# Patient Record
Sex: FEMALE | URBAN - METROPOLITAN AREA
[De-identification: names, ages, dates, MRNs, and addresses within clinical notes are randomized per-mention and may not be internally consistent; named-entity substitution may affect disease eponyms.]

---

## 2022-12-11 ENCOUNTER — EMERGENCY (EMERGENCY)
Facility: HOSPITAL | Age: 72
LOS: 1 days | Discharge: HOME | End: 2022-12-11
Attending: EMERGENCY MEDICINE
Payer: MEDICARE

## 2022-12-11 VITALS
HEIGHT: 63 IN | DIASTOLIC BLOOD PRESSURE: 80 MMHG | SYSTOLIC BLOOD PRESSURE: 168 MMHG | OXYGEN SATURATION: 99 % | TEMPERATURE: 98 F | HEART RATE: 75 BPM | RESPIRATION RATE: 16 BRPM | WEIGHT: 169.98 LBS

## 2022-12-11 LAB
ALBUMIN SERPL ELPH-MCNC: 4.5 G/DL — SIGNIFICANT CHANGE UP (ref 3.3–5)
ALP SERPL-CCNC: 79 U/L — SIGNIFICANT CHANGE UP (ref 40–120)
ALT FLD-CCNC: 21 U/L — SIGNIFICANT CHANGE UP (ref 10–45)
ANION GAP SERPL CALC-SCNC: 16 MMOL/L — SIGNIFICANT CHANGE UP (ref 5–17)
APPEARANCE UR: CLEAR — SIGNIFICANT CHANGE UP
AST SERPL-CCNC: 27 U/L — SIGNIFICANT CHANGE UP (ref 10–40)
BACTERIA # UR AUTO: NEGATIVE — SIGNIFICANT CHANGE UP
BASE EXCESS BLDMV CALC-SCNC: 0.4 MMOL/L — SIGNIFICANT CHANGE UP (ref -3–3)
BASOPHILS # BLD AUTO: 0.02 K/UL — SIGNIFICANT CHANGE UP (ref 0–0.2)
BASOPHILS NFR BLD AUTO: 0.3 % — SIGNIFICANT CHANGE UP (ref 0–2)
BILIRUB SERPL-MCNC: 0.5 MG/DL — SIGNIFICANT CHANGE UP (ref 0.2–1.2)
BILIRUB UR-MCNC: NEGATIVE — SIGNIFICANT CHANGE UP
BUN SERPL-MCNC: 16 MG/DL — SIGNIFICANT CHANGE UP (ref 7–23)
CALCIUM SERPL-MCNC: 9.4 MG/DL — SIGNIFICANT CHANGE UP (ref 8.4–10.5)
CHLORIDE SERPL-SCNC: 106 MMOL/L — SIGNIFICANT CHANGE UP (ref 96–108)
CO2 BLDMV-SCNC: 28 MMOL/L — SIGNIFICANT CHANGE UP (ref 21–29)
CO2 SERPL-SCNC: 20 MMOL/L — LOW (ref 22–31)
COHGB MFR BLDMV: 0.9 % — SIGNIFICANT CHANGE UP (ref 0–3)
COLOR SPEC: COLORLESS — SIGNIFICANT CHANGE UP
CREAT SERPL-MCNC: 0.72 MG/DL — SIGNIFICANT CHANGE UP (ref 0.5–1.3)
DIFF PNL FLD: NEGATIVE — SIGNIFICANT CHANGE UP
EGFR: 90 ML/MIN/1.73M2 — SIGNIFICANT CHANGE UP
EOSINOPHIL # BLD AUTO: 0.05 K/UL — SIGNIFICANT CHANGE UP (ref 0–0.5)
EOSINOPHIL NFR BLD AUTO: 0.8 % — SIGNIFICANT CHANGE UP (ref 0–6)
EPI CELLS # UR: 0 /HPF — SIGNIFICANT CHANGE UP
FLUAV AG NPH QL: SIGNIFICANT CHANGE UP
FLUBV AG NPH QL: SIGNIFICANT CHANGE UP
GAS PNL BLDMV: SIGNIFICANT CHANGE UP
GLUCOSE SERPL-MCNC: 101 MG/DL — HIGH (ref 70–99)
GLUCOSE UR QL: NEGATIVE — SIGNIFICANT CHANGE UP
HCO3 BLDMV-SCNC: 27 MMOL/L — SIGNIFICANT CHANGE UP (ref 20–28)
HCT VFR BLD CALC: 39.1 % — SIGNIFICANT CHANGE UP (ref 34.5–45)
HGB BLD-MCNC: 12.7 G/DL — SIGNIFICANT CHANGE UP (ref 11.5–15.5)
HGB FLD-MCNC: 12.8 G/DL — SIGNIFICANT CHANGE UP (ref 11.7–16.1)
HYALINE CASTS # UR AUTO: 1 /LPF — SIGNIFICANT CHANGE UP (ref 0–2)
IMM GRANULOCYTES NFR BLD AUTO: 0.2 % — SIGNIFICANT CHANGE UP (ref 0–0.9)
KETONES UR-MCNC: NEGATIVE — SIGNIFICANT CHANGE UP
LEUKOCYTE ESTERASE UR-ACNC: NEGATIVE — SIGNIFICANT CHANGE UP
LYMPHOCYTES # BLD AUTO: 1.43 K/UL — SIGNIFICANT CHANGE UP (ref 1–3.3)
LYMPHOCYTES # BLD AUTO: 23.3 % — SIGNIFICANT CHANGE UP (ref 13–44)
MAGNESIUM SERPL-MCNC: 1.8 MG/DL — SIGNIFICANT CHANGE UP (ref 1.6–2.6)
MCHC RBC-ENTMCNC: 29.1 PG — SIGNIFICANT CHANGE UP (ref 27–34)
MCHC RBC-ENTMCNC: 32.5 GM/DL — SIGNIFICANT CHANGE UP (ref 32–36)
MCV RBC AUTO: 89.7 FL — SIGNIFICANT CHANGE UP (ref 80–100)
METHGB MFR BLDMV: 0 % — SIGNIFICANT CHANGE UP (ref 0–1.5)
MONOCYTES # BLD AUTO: 0.47 K/UL — SIGNIFICANT CHANGE UP (ref 0–0.9)
MONOCYTES NFR BLD AUTO: 7.6 % — SIGNIFICANT CHANGE UP (ref 2–14)
NEUTROPHILS # BLD AUTO: 4.17 K/UL — SIGNIFICANT CHANGE UP (ref 1.8–7.4)
NEUTROPHILS NFR BLD AUTO: 67.8 % — SIGNIFICANT CHANGE UP (ref 43–77)
NITRITE UR-MCNC: NEGATIVE — SIGNIFICANT CHANGE UP
NRBC # BLD: 0 /100 WBCS — SIGNIFICANT CHANGE UP (ref 0–0)
NT-PROBNP SERPL-SCNC: 190 PG/ML — SIGNIFICANT CHANGE UP (ref 0–300)
O2 CT VFR BLD CALC: 36 MMHG — SIGNIFICANT CHANGE UP (ref 30–65)
O2 CT VFR BLDMV CALC: 8.7 ML/DL — LOW (ref 18–22)
OXYHGB MFR BLDMV: 57.9 % — LOW (ref 90–95)
PCO2 BLDMV: 51 MMHG — SIGNIFICANT CHANGE UP (ref 30–65)
PH BLDMV: 7.33 — SIGNIFICANT CHANGE UP (ref 7.32–7.45)
PH UR: 5.5 — SIGNIFICANT CHANGE UP (ref 5–8)
PHOSPHATE SERPL-MCNC: 2.8 MG/DL — SIGNIFICANT CHANGE UP (ref 2.5–4.5)
PLATELET # BLD AUTO: 205 K/UL — SIGNIFICANT CHANGE UP (ref 150–400)
POTASSIUM SERPL-MCNC: 3.9 MMOL/L — SIGNIFICANT CHANGE UP (ref 3.5–5.3)
POTASSIUM SERPL-SCNC: 3.9 MMOL/L — SIGNIFICANT CHANGE UP (ref 3.5–5.3)
PROCALCITONIN SERPL-MCNC: 0.04 NG/ML — SIGNIFICANT CHANGE UP (ref 0.02–0.1)
PROT SERPL-MCNC: 7.6 G/DL — SIGNIFICANT CHANGE UP (ref 6–8.3)
PROT UR-MCNC: NEGATIVE — SIGNIFICANT CHANGE UP
RBC # BLD: 4.36 M/UL — SIGNIFICANT CHANGE UP (ref 3.8–5.2)
RBC # FLD: 12.5 % — SIGNIFICANT CHANGE UP (ref 10.3–14.5)
RBC CASTS # UR COMP ASSIST: 0 /HPF — SIGNIFICANT CHANGE UP (ref 0–4)
RSV RNA NPH QL NAA+NON-PROBE: SIGNIFICANT CHANGE UP
SAO2 % BLDMV: 58.4 % — LOW (ref 60–90)
SARS-COV-2 RNA SPEC QL NAA+PROBE: SIGNIFICANT CHANGE UP
SODIUM SERPL-SCNC: 142 MMOL/L — SIGNIFICANT CHANGE UP (ref 135–145)
SP GR SPEC: 1 — LOW (ref 1.01–1.02)
TROPONIN T, HIGH SENSITIVITY RESULT: 6 NG/L — SIGNIFICANT CHANGE UP (ref 0–51)
TSH SERPL-MCNC: 1.6 UIU/ML — SIGNIFICANT CHANGE UP (ref 0.27–4.2)
UROBILINOGEN FLD QL: NEGATIVE — SIGNIFICANT CHANGE UP
WBC # BLD: 6.15 K/UL — SIGNIFICANT CHANGE UP (ref 3.8–10.5)
WBC # FLD AUTO: 6.15 K/UL — SIGNIFICANT CHANGE UP (ref 3.8–10.5)
WBC UR QL: 1 /HPF — SIGNIFICANT CHANGE UP (ref 0–5)

## 2022-12-11 PROCEDURE — 70498 CT ANGIOGRAPHY NECK: CPT | Mod: 26,MA

## 2022-12-11 PROCEDURE — 99285 EMERGENCY DEPT VISIT HI MDM: CPT | Mod: GC

## 2022-12-11 PROCEDURE — 71046 X-RAY EXAM CHEST 2 VIEWS: CPT | Mod: 26

## 2022-12-11 PROCEDURE — 99285 EMERGENCY DEPT VISIT HI MDM: CPT | Mod: FS

## 2022-12-11 PROCEDURE — 70496 CT ANGIOGRAPHY HEAD: CPT | Mod: 26,MA

## 2022-12-11 PROCEDURE — 93010 ELECTROCARDIOGRAM REPORT: CPT

## 2022-12-11 RX ORDER — SODIUM CHLORIDE 9 MG/ML
1000 INJECTION, SOLUTION INTRAVENOUS
Refills: 0 | Status: DISCONTINUED | OUTPATIENT
Start: 2022-12-11 | End: 2022-12-12

## 2022-12-11 RX ORDER — SODIUM CHLORIDE 9 MG/ML
1000 INJECTION, SOLUTION INTRAVENOUS ONCE
Refills: 0 | Status: COMPLETED | OUTPATIENT
Start: 2022-12-11 | End: 2022-12-11

## 2022-12-11 RX ADMIN — SODIUM CHLORIDE 2000 MILLILITER(S): 9 INJECTION, SOLUTION INTRAVENOUS at 18:59

## 2022-12-11 NOTE — CONSULT NOTE ADULT - SUBJECTIVE AND OBJECTIVE BOX
Neurology - Consult Note    Spectra: [phone] ([facility]), [phone] ([facility])    HPI: Patient SUJATA ARANGO is a Bulgarian-speaking 72y (1950) woman with Hx of HTN, HLD, breast cancer in remission, who presents to the ED complaining of AMS since 3PM in the afternoon. Daughter at bedside reports patient called her in the afternoon to tell her she was feelign nauseated and that "everything went white." Patient was found snaking a  drain outside her house. Patient did not recall calling her daughter and does not recall snaking the drain. Per daughter patient is now slowly returning to her baseline, and patient slowly starting to recall some of the details from this afternoon. She was oriented to self, place, time, but had blanks in her memory. This is the first time something like this has happened. She reports now that she was in the  for multiple hours today, inhaling fumes.  Patient denies fevers, chills, nausea, vomiting, abdominal pain, chest pain, headache, dizziness, syncope. She ambulated steadily to the bathroom.   VS in ED: Temp 97.7, HR 75. /80. RR 16. O2 99%.       Review of Systems:  INCOMPLETE   CONSTITUTIONAL: No fevers or chills  EYES AND ENT: No visual changes or no throat pain   NECK: No pain or stiffness  RESPIRATORY: No hemoptysis or shortness of breath  CARDIOVASCULAR: No chest pain or palpitations  GASTROINTESTINAL: No melena or hematochezia  GENITOURINARY: No dysuria or hematuria  NEUROLOGICAL: +As stated in HPI above  SKIN: No itching, burning, rashes, or lesions   All other review of systems is negative unless indicated above.    Allergies:      PMHx/PSHx/Family Hx: As above, otherwise see below   No pertinent past medical history        Social Hx:  No current use of tobacco, alcohol, or illicit drugs  Lives with ***    Medications:  MEDICATIONS  (STANDING):  lactated ringers. 1000 milliLiter(s) (150 mL/Hr) IV Continuous <Continuous>    MEDICATIONS  (PRN):      Vitals:  T(C): 36.4 (12-11-22 @ 18:23), Max: 36.5 (12-11-22 @ 17:43)  HR: 73 (12-11-22 @ 18:23) (73 - 75)  BP: 153/89 (12-11-22 @ 18:23) (153/89 - 168/80)  RR: 18 (12-11-22 @ 18:23) (16 - 18)  SpO2: 100% (12-11-22 @ 18:23) (99% - 100%)    Physical Examination: INCOMPLETE  General - NAD  Cardiovascular - Peripheral pulses palpable, no edema  Eyes - Fundoscopy with flat, sharp optic discs and no hemorrhage or exudates; Fundoscopy not well visualized; Fundoscopy not performed due to safety precautions in the setting of the COVID-19 pandemic    Neurologic Exam:  Mental status - Awake, Alert, Oriented to person, place, and time. Speech fluent, repetition and naming intact. Follows simple and complex commands. Attention/concentration, recent and remote memory (including registration and recall), and fund of knowledge intact    Cranial nerves - PERRLA, VFF, EOMI, face sensation (V1-V3) intact b/l, facial strength intact without asymmetry b/l, hearing intact b/l, palate with symmetric elevation, trapezius OR sternocleidomastiod 5/5 strength b/l, tongue midline on protrusion with full lateral movement    Motor - Normal bulk and tone throughout. No pronator drift.  Strength testing            Deltoid      Biceps      Triceps     Wrist Extension    Wrist Flexion     Interossei         R            5                 5               5                     5                              5                        5                 5  L             5                 5               5                     5                              5                        5                 5              Hip Flexion    Hip Extension    Knee Flexion    Knee Extension    Dorsiflexion    Plantar Flexion  R              5                           5                       5                           5                            5                          5  L              5                           5                        5                           5                            5                          5    Sensation - Light touch/temperature OR pain/vibration intact throughout    DTR's -             Biceps      Triceps     Brachioradialis      Patellar    Ankle    Toes/plantar response  R             2+             2+                  2+                       2+            2+                 Down  L              2+             2+                 2+                        2+           2+                 Down    Coordination - Finger to Nose intact b/l. No tremors appreciated    Gait and station - Normal casual gait. Romberg (-)    Labs:                        12.7   6.15  )-----------( 205      ( 11 Dec 2022 19:04 )             39.1     12-11    142  |  106  |  16  ----------------------------<  101<H>  3.9   |  20<L>  |  0.72    Ca    9.4      11 Dec 2022 19:04  Phos  2.8     12-11  Mg     1.8     12-11    TPro  7.6  /  Alb  4.5  /  TBili  0.5  /  DBili  x   /  AST  27  /  ALT  21  /  AlkPhos  79  12-11    CAPILLARY BLOOD GLUCOSE      POCT Blood Glucose.: 96 mg/dL (11 Dec 2022 18:56)    LIVER FUNCTIONS - ( 11 Dec 2022 19:04 )  Alb: 4.5 g/dL / Pro: 7.6 g/dL / ALK PHOS: 79 U/L / ALT: 21 U/L / AST: 27 U/L / GGT: x               CSF:                  Radiology:     Neurology - Consult Note    Spectra: [phone] ([facility]), [phone] ([facility])    HPI: Patient SUJATA ARANGO is a Sudanese-speaking 72y (1950) woman with Hx of HTN, HLD, breast cancer in remission, who presents to the ED complaining of AMS since 3PM in the afternoon. Daughter at bedside reports patient called her in the afternoon to tell her she was feeling nauseated and that "everything went white." Patient was found snaking a  drain outside her house. Patient did not recall calling her daughter and does not recall snaking the drain. Per daughter patient is now slowly returning to her baseline, and patient slowly starting to recall some of the details from this afternoon. She was oriented to self, place, time, but had blanks in her memory. This is the first time something like this has happened. She reports now that she was in the  for multiple hours today, inhaling fumes.  Patient denies fevers, chills, nausea, vomiting, abdominal pain, chest pain, headache, dizziness, syncope. She ambulated steadily to the bathroom.   VS in ED: Temp 97.7, HR 75. /80. RR 16. O2 99%.     Patient was seen in the ED. Daughter at bedside helping to translate. Patient states she was working for 3 hours today outside, snaking a pipe, and was inhaling the fumes during that time. She called her daughter on the phone at 12:!5PM to discuss plans for the day. She was at her baseline then per daughter. At about 3PM, her daughter came to pick her up to take her shopping, as discussed earlier, and patient did not remember their prior conversation or plans for the day. She realized she had blanks in her memory. Some of her memory slowly came back; eg, she remembered she had gone to [place] earlier in the day. Patient felt fine overall but daughters concerned so brought her in to the ED for further evaluation. SInce time her daughters were with her, patient has had no further deficits in retaining her memory going forward from 3PM. Patient looks well at bedside. On ROS, denies any headache, fevers, chills, nausea, vomiting, diarrhea, constipation, changes in urination, weakness of arms or legs, sensory deficits in arms or legs, changes in vision, smell, taste, hearing, difficulty with words or speech.     PMH: Stage 0-1 breast cancer, s/p 6 days of radiation therapy, now on letrozole 2.5mg qd; HTN, HLD, no prior strokes or MI, not on any blood thinners  Allergies: NKDA,   No recent travel  No recent hospitalizations  Immunized for covid  Surgical hx: hysterectomy for prolapse, chelecystectomy  SH: denies alcohol use or smoking. Lives independently with her , is 's primary caretaker  FH: breast cancer in aunt      Review of Systems:    CONSTITUTIONAL: No fevers or chills  EYES AND ENT: No visual changes or no throat pain   NECK: No pain or stiffness  RESPIRATORY: No hemoptysis or shortness of breath  CARDIOVASCULAR: No chest pain or palpitations  GASTROINTESTINAL: No melena or hematochezia  GENITOURINARY: No dysuria or hematuria  NEUROLOGICAL: +As stated in HPI above  SKIN: No itching, burning, rashes, or lesions   All other review of systems is negative unless indicated above.    Allergies:      PMHx/PSHx/Family Hx: As above, otherwise see below   No pertinent past medical history        Social Hx:  No current use of tobacco, alcohol, or illicit drugs       Medications:  MEDICATIONS  (STANDING):  lactated ringers. 1000 milliLiter(s) (150 mL/Hr) IV Continuous <Continuous>    MEDICATIONS  (PRN):      Vitals:  T(C): 36.4 (12-11-22 @ 18:23), Max: 36.5 (12-11-22 @ 17:43)  HR: 73 (12-11-22 @ 18:23) (73 - 75)  BP: 153/89 (12-11-22 @ 18:23) (153/89 - 168/80)  RR: 18 (12-11-22 @ 18:23) (16 - 18)  SpO2: 100% (12-11-22 @ 18:23) (99% - 100%)    Physical Examination:  General - NAD  Cardiovascular - Peripheral pulses palpable, no edema  Eyes -  Fundoscopy not performed due to safety precautions in the setting of the COVID-19 pandemic    Neurologic Exam:  Mental status - Awake, Alert, Oriented to person, place, and time (off by 1 day for date). Speech fluent, repetition and naming intact. Follows simple and complex commands. Attention/concentration, recent and remote memory (including registration and recall), and fund of knowledge intact. Is able to draw a clock. Is able to read words.     Cranial nerves - PERRL, VFF, EOMI, face sensation (V1-V3) intact b/l, facial strength intact without asymmetry b/l, hearing intact b/l, palate with symmetric elevation, trapezius and sternocleidomastiod 5/5 strength b/l, tongue midline on protrusion with full lateral movement    Motor - Normal bulk and tone throughout. No pronator drift.     Strength testing Very strong             Deltoid      Biceps      Triceps     Wrist Extension    Wrist Flexion     Interossei         R            5                 5               5                     5                       5                        5                 5  L             5                 5               5                     5                      5                        5                 5              Hip Flexion    Hip Extension    Knee Flexion    Knee Extension    Dorsiflexion    Plantar Flexion  R              5                           5                       5                           5                            5                          5  L              5                           5                        5                           5                            5                          5    Sensation - Light touch intact throughout    DTR's - brisk             Biceps      Triceps     Brachioradialis      Patellar    Ankle    Toes/plantar response  R             2+             2+                  2+                       2+            2+                 Down  L              2+             2+                 2+                        2+           2+                 Down    Coordination - Finger to Nose intact b/l. No tremors appreciated    Gait and station - Normal casual gait. Romberg (-)    Labs:                        12.7   6.15  )-----------( 205      ( 11 Dec 2022 19:04 )             39.1     12-11    142  |  106  |  16  ----------------------------<  101<H>  3.9   |  20<L>  |  0.72    Ca    9.4      11 Dec 2022 19:04  Phos  2.8     12-11  Mg     1.8     12-11    TPro  7.6  /  Alb  4.5  /  TBili  0.5  /  DBili  x   /  AST  27  /  ALT  21  /  AlkPhos  79  12-11    CAPILLARY BLOOD GLUCOSE      POCT Blood Glucose.: 96 mg/dL (11 Dec 2022 18:56)    LIVER FUNCTIONS - ( 11 Dec 2022 19:04 )  Alb: 4.5 g/dL / Pro: 7.6 g/dL / ALK PHOS: 79 U/L / ALT: 21 U/L / AST: 27 U/L / GGT: x               CSF:      Radiology:      ACC: [id number] EXAM: CT ANGIO BRAIN (W)AW IC  ACC: [id number] EXAM: CT ANGIO NECK (W)AW IC  ACC: [id number] EXAM: CT BRAIN    PROCEDURE DATE: 12/11/2022        INTERPRETATION: CLINICAL HISTORY: Delirium versus total: 1 lesion, now resolved.    TECHNIQUE:  Noncontrast head CT was followed by CT images acquired through the neck and head during the arterial phase.  Intravenous contrast: 90 cc of Omnipaque-350 mg/ml were administered; 10 cc were discarded.  Three-dimensional MIP reformats were generated.    COMPARISON STUDY: None.    FINDINGS:    NONCONTRAST CT HEAD:    There is no acute intracranial hemorrhage, mass effect, midline shift, extra-axial collection, hydrocephalus, or evidence of acute vascular territorial infarction.    The visualized paranasal sinuses and mastoid air cells are clear. Visualized osseous structures are intact.    CT ANGIOGRAPHY NECK:    Thoracic aorta and branch vessels: Patent. No atherosclerosis. No flow-limiting stenosis. No evidence of dissection.    Right carotid system: Patent. No atherosclerosis. No hemodynamically significant stenosis using NASCET criteria. No evidence of dissection.    Left carotid system: Patent. No atherosclerosis. No hemodynamically significant stenosis using NASCET criteria. No evidence of dissection.    Vertebral arteries: Patent. No atherosclerosis. No flow-limiting stenosis. No evidence of dissection.    Soft tissues of the neck: Heterogeneous thyroid gland with dominant 1.3 cm hypodense right thyroid lobe nodule. Coarse calcifications of the right palatine tonsil.    Visualized spine: Multilevel degenerative change.    Visualized upper chest: Right upper lobe calcified granuloma..    CT ANGIOGRAPHY BRAIN:    Internal carotid arteries: Patent bilaterally. No flow limiting stenosis. 1.5 mm inferiorly oriented conical outpouching off of the left supraclinoid internal carotid artery, likely representing an infundibular origin.    Anterior cerebral arteries: Patent bilaterally without flow limiting stenosis.    Middle cerebral arteries: Patent bilaterally without flow limiting stenosis.    Anterior communicating artery: Visualized.    Posterior communicating arteries: Poorly visualized.    Posterior cerebral arteries: Patent bilaterally without stenosis.    Vertebrobasilar: Patent without stenosis. The distal vertebral arteries are similar in caliber. Bilateral posterior inferior cerebellar arteries, bilateral anterior inferior cerebellar arteries and bilateral superior cerebellar arteries are visualized.    Vascular lesions: No evidence of intracranial aneurysm within limits of CTA technique. Tiny aneurysms may be beyond the resolution of this examination.    Dural venous sinuses: Grossly patent.    IMPRESSION:    Noncontrast CT Head: No acute intracranal hemorrhage, mass effect, or evidence of acute vascular territorial infarct. If clinical symptoms persist or worsen, more sensitive evaluation with brain MRI may be obtained, if no contraindications exist.    CTA Neck: No significant flow-limiting stenosis or evidence of acute dissection within the cervical carotid or vertebral arteries.    Heterogeneous thyroid gland with dominant 1.3 cm right thyroid lobe nodule. Nonemergent thyroid sonogram may be obtained for further evaluation.    CTA Head: No proximal large vessel occlusion.     Neurology - Consult Note    Spectra: [phone] ([facility]), [phone] ([facility])    HPI: Patient SUJATA ARANGO is a Maltese-speaking 72y (1950) woman with Hx of HTN, HLD, breast cancer in remission, who presents to the ED complaining of AMS since 3PM in the afternoon. Daughter at bedside reports patient called her in the afternoon to tell her she was feeling nauseated and that "everything went white." Patient was found snaking a  drain outside her house. Patient did not recall calling her daughter and does not recall snaking the drain. Per daughter patient is now slowly returning to her baseline, and patient slowly starting to recall some of the details from this afternoon. She was oriented to self, place, time, but had blanks in her memory. This is the first time something like this has happened. She reports now that she was in the  for multiple hours today, inhaling fumes.  Patient denies fevers, chills, nausea, vomiting, abdominal pain, chest pain, headache, dizziness, syncope. She ambulated steadily to the bathroom.   VS in ED: Temp 97.7, HR 75. /80. RR 16. O2 99%.     Patient was seen in the ED. Daughter at bedside helping to translate. Patient states she was working for 3 hours today outside, snaking a pipe, and was inhaling the fumes during that time. She called her daughter on the phone at 12:!5PM to discuss plans for the day. She was at her baseline then per daughter. At about 3PM, her daughter came to pick her up to take her shopping, as discussed earlier, and patient did not remember their prior conversation or plans for the day. She realized she had blanks in her memory. Some of her memory slowly came back; eg, she remembered she had gone to [employer] earlier in the day. Patient felt fine overall but daughters concerned so brought her in to the ED for further evaluation. SInce time her daughters were with her, patient has had no further deficits in retaining her memory going forward from 3PM. Patient looks well at bedside. On ROS, denies any headache, fevers, chills, nausea, vomiting, diarrhea, constipation, changes in urination, weakness of arms or legs, sensory deficits in arms or legs, changes in vision, smell, taste, hearing, difficulty with words or speech.     PMH: Stage 0-1 breast cancer, s/p 6 days of radiation therapy, now on letrozole 2.5mg qd; HTN, HLD, no prior strokes or MI, not on any blood thinners  Allergies: NKDA,   No recent travel  No recent hospitalizations  Immunized for covid  Surgical hx: hysterectomy for prolapse, chelecystectomy  SH: denies alcohol use or smoking. Lives independently with her , is 's primary caretaker  FH: breast cancer in aunt      Review of Systems:    CONSTITUTIONAL: No fevers or chills  EYES AND ENT: No visual changes or no throat pain   NECK: No pain or stiffness  RESPIRATORY: No hemoptysis or shortness of breath  CARDIOVASCULAR: No chest pain or palpitations  GASTROINTESTINAL: No melena or hematochezia  GENITOURINARY: No dysuria or hematuria  NEUROLOGICAL: +As stated in HPI above  SKIN: No itching, burning, rashes, or lesions   All other review of systems is negative unless indicated above.    Allergies: NKDA      PMHx/PSHx/Family Hx: As above, otherwise see below   No pertinent past medical history        Social Hx:  No current use of tobacco, alcohol, or illicit drugs       Medications:  MEDICATIONS  (STANDING):  lactated ringers. 1000 milliLiter(s) (150 mL/Hr) IV Continuous <Continuous>    MEDICATIONS  (PRN):      Vitals:  T(C): 36.4 (12-11-22 @ 18:23), Max: 36.5 (12-11-22 @ 17:43)  HR: 73 (12-11-22 @ 18:23) (73 - 75)  BP: 153/89 (12-11-22 @ 18:23) (153/89 - 168/80)  RR: 18 (12-11-22 @ 18:23) (16 - 18)  SpO2: 100% (12-11-22 @ 18:23) (99% - 100%)    Physical Examination:  General - NAD  Cardiovascular - Peripheral pulses palpable, no edema  Eyes -  Fundoscopy not performed due to safety precautions in the setting of the COVID-19 pandemic    Neurologic Exam:  Mental status - Awake, Alert, Oriented to person, place, and time (off by 1 day for date). Speech fluent, repetition and naming intact. Follows simple and complex commands. Attention/concentration, recent and remote memory (including registration and recall), and fund of knowledge intact. Is able to draw a clock. Is able to read words.     Cranial nerves - PERRL, VFF, EOMI, face sensation (V1-V3) intact b/l, facial strength intact without asymmetry b/l, hearing intact b/l, palate with symmetric elevation, trapezius and sternocleidomastoid 5/5 strength b/l, tongue midline on protrusion with full lateral movement    Motor - Normal bulk and tone throughout. No pronator drift.     Strength testing Very strong             Deltoid      Biceps      Triceps     Wrist Extension    Wrist Flexion     Interossei         R            5                 5               5                     5                       5                        5                 5  L             5                 5               5                     5                      5                        5                 5              Hip Flexion    Hip Extension    Knee Flexion    Knee Extension    Dorsiflexion    Plantar Flexion  R              5                           5                       5                           5                            5                          5  L              5                           5                        5                           5                            5                          5    Sensation - Light touch intact throughout    DTR's - brisk             Biceps      Triceps     Brachioradialis      Patellar    Ankle    Toes/plantar response  R             2+             2+                  2+                       2+            2+                 Down  L              2+             2+                 2+                        2+           2+                 Down    Coordination - Finger to Nose intact b/l. No tremors appreciated    Gait and station - Normal casual gait. Romberg (-)    Labs:                        12.7   6.15  )-----------( 205      ( 11 Dec 2022 19:04 )             39.1     12-11    142  |  106  |  16  ----------------------------<  101<H>  3.9   |  20<L>  |  0.72    Ca    9.4      11 Dec 2022 19:04  Phos  2.8     12-11  Mg     1.8     12-11    TPro  7.6  /  Alb  4.5  /  TBili  0.5  /  DBili  x   /  AST  27  /  ALT  21  /  AlkPhos  79  12-11    CAPILLARY BLOOD GLUCOSE      POCT Blood Glucose.: 96 mg/dL (11 Dec 2022 18:56)    LIVER FUNCTIONS - ( 11 Dec 2022 19:04 )  Alb: 4.5 g/dL / Pro: 7.6 g/dL / ALK PHOS: 79 U/L / ALT: 21 U/L / AST: 27 U/L / GGT: x               CSF:      Radiology:      ACC: [id number] EXAM: CT ANGIO BRAIN (W)AW IC  ACC: [id number] EXAM: CT ANGIO NECK (W)AW IC  ACC: [id number] EXAM: CT BRAIN    PROCEDURE DATE: 12/11/2022        INTERPRETATION: CLINICAL HISTORY: Delirium versus total: 1 lesion, now resolved.    TECHNIQUE:  Noncontrast head CT was followed by CT images acquired through the neck and head during the arterial phase.  Intravenous contrast: 90 cc of Omnipaque-350 mg/ml were administered; 10 cc were discarded.  Three-dimensional MIP reformats were generated.    COMPARISON STUDY: None.    FINDINGS:    NONCONTRAST CT HEAD:    There is no acute intracranial hemorrhage, mass effect, midline shift, extra-axial collection, hydrocephalus, or evidence of acute vascular territorial infarction.    The visualized paranasal sinuses and mastoid air cells are clear. Visualized osseous structures are intact.    CT ANGIOGRAPHY NECK:    Thoracic aorta and branch vessels: Patent. No atherosclerosis. No flow-limiting stenosis. No evidence of dissection.    Right carotid system: Patent. No atherosclerosis. No hemodynamically significant stenosis using NASCET criteria. No evidence of dissection.    Left carotid system: Patent. No atherosclerosis. No hemodynamically significant stenosis using NASCET criteria. No evidence of dissection.    Vertebral arteries: Patent. No atherosclerosis. No flow-limiting stenosis. No evidence of dissection.    Soft tissues of the neck: Heterogeneous thyroid gland with dominant 1.3 cm hypodense right thyroid lobe nodule. Coarse calcifications of the right palatine tonsil.    Visualized spine: Multilevel degenerative change.    Visualized upper chest: Right upper lobe calcified granuloma..    CT ANGIOGRAPHY BRAIN:    Internal carotid arteries: Patent bilaterally. No flow limiting stenosis. 1.5 mm inferiorly oriented conical outpouching off of the left supraclinoid internal carotid artery, likely representing an infundibular origin.    Anterior cerebral arteries: Patent bilaterally without flow limiting stenosis.    Middle cerebral arteries: Patent bilaterally without flow limiting stenosis.    Anterior communicating artery: Visualized.    Posterior communicating arteries: Poorly visualized.    Posterior cerebral arteries: Patent bilaterally without stenosis.    Vertebrobasilar: Patent without stenosis. The distal vertebral arteries are similar in caliber. Bilateral posterior inferior cerebellar arteries, bilateral anterior inferior cerebellar arteries and bilateral superior cerebellar arteries are visualized.    Vascular lesions: No evidence of intracranial aneurysm within limits of CTA technique. Tiny aneurysms may be beyond the resolution of this examination.    Dural venous sinuses: Grossly patent.    IMPRESSION:    Noncontrast CT Head: No acute intracranal hemorrhage, mass effect, or evidence of acute vascular territorial infarct. If clinical symptoms persist or worsen, more sensitive evaluation with brain MRI may be obtained, if no contraindications exist.    CTA Neck: No significant flow-limiting stenosis or evidence of acute dissection within the cervical carotid or vertebral arteries.    Heterogeneous thyroid gland with dominant 1.3 cm right thyroid lobe nodule. Nonemergent thyroid sonogram may be obtained for further evaluation.    CTA Head: No proximal large vessel occlusion.

## 2022-12-11 NOTE — ED ADULT NURSE NOTE - ISOLATION TYPE:
None Xelasiaz Pregnancy And Lactation Text: This medication is Pregnancy Category D and is not considered safe during pregnancy.  The risk during breast feeding is also uncertain.

## 2022-12-11 NOTE — ED PROVIDER NOTE - PHYSICAL EXAMINATION
CONSTITUTIONAL: Well appearing and in no apparent distress.  ENT: Airway patent, moist mucous membranes.   EYES: Pupils equal, round and reactive to light. EOMI. Conjunctiva normal appearing.   CARDIAC: Normal rate, regular rhythm.  Heart sounds S1, S2.    RESPIRATORY: Breath sounds clear and equal bilaterally.   GASTROINTESTINAL: Abdomen soft, non-tender, not distended.  MUSCULOSKELETAL: Spine appears normal.   NEUROLOGICAL: Alert and oriented x3, no focal deficits, no motor or sensory deficits. 5/5 muscle strength throughout. Non focal neuro exam. Ambulating without difficulty.   SKIN: Skin normal color, warm, dry and intact.   PSYCHIATRIC: Normal mood and affect.

## 2022-12-11 NOTE — ED ADULT TRIAGE NOTE - CHIEF COMPLAINT QUOTE
came here with the daughter for medical eval secondary to AMS earlier around 3pm. Patient back to normal baseline mental status while in triage. h/o Breast cancer HLD and HTN. BEFAST negative.

## 2022-12-11 NOTE — ED ADULT NURSE NOTE - OBJECTIVE STATEMENT
71 y/o F with PMH of breast cancer, HTN, HLD presents to ED complaining of altered mental status. 71 y/o F with PMH of breast cancer, HTN, HLD presents to ED complaining of altered mental status. Pt reports being in a  for multiple hours today at her home w/  and inhaling fumes. As per daughter at bedside, Pt called her on the phone and noticed pt was forgetting moments that had happened earlier in the day. Pt was oriented to self, time and place but had memory loss. Pt reported having some episodes of nauseas but denies vomiting. Upon arrival, pt is A&Ox3, moving all extremities, denies any numbness or tingling in extremities. Walked to bathroom with steady gait. Pt has no complaints at this time. Pt is well appearing. Denies headache, dizziness, vision changes, chest pain, shortness of breath, abdominal pain, diarrhea, fevers, chills, dysuria, hematuria, recent travel or fall.

## 2022-12-11 NOTE — ED PROVIDER NOTE - ATTENDING APP SHARED VISIT CONTRIBUTION OF CARE
------------ATTENDING NOTE------------  pt w/ daughter (easily translating when needed, both declined additional translation services) c/o episode of confusion earlier in day, per daughter pt had a busy stressful morning ([employer], caring for ill , standing in septic drain 5 ft below garage to snake clogged plumbing), pt spoke to daughter in AM and everything was normal, at 3PM pt called daughter upset and described feeling "off" (nausea, everything turned "white", no chest pain/discomfort or sob/dyspnea or palpitations or trauma), at this time another daughter came home and pt was confused to immediate recent events, no additional loss of function or stroke-like symptoms, pt has been back to baseline since (apart from some confusion around snaking drain), NIHSS = 0, more likely brief delirium -vs- TGA (2nd stress, dehydration, etc), awaiting labs/imaging and close reassessments -->  - Rubin Johnston MD   ----------------------------------------------- ------------ATTENDING NOTE------------  pt w/ daughter (easily translating when needed, both declined additional translation services) c/o episode of confusion earlier in day, per daughter pt had a busy stressful morning ([employer], caring for ill , standing in septic drain 5 ft below garage to snake clogged plumbing), pt spoke to daughter in AM and everything was normal, at 3PM pt called daughter upset and described feeling "off" (nausea, everything turned "white", no chest pain/discomfort or sob/dyspnea or palpitations or trauma), at this time another daughter came home and pt was confused to immediate recent events, no additional loss of function or stroke-like symptoms, pt has been back to baseline since (apart from some confusion around snaking drain), NIHSS = 0, more likely brief delirium -vs- TGA (2nd stress, dehydration, etc), awaiting labs/imaging and close reassessments --> remained stable w/o new/worse symptoms, plan for CDU for MRI, Neuro recs, continued piper burr, outpt needs assessments.  - Rubin Johnston MD   -----------------------------------------------

## 2022-12-11 NOTE — CONSULT NOTE ADULT - ATTENDING COMMENTS
HPI as per resident note, personally verified by me. Patient with reported memory lapses following strenuous work on her drain while in cold weather and inhaling chemical fumes. No focal neurologic deficits reported and no abnormal movements. She is now back to her neurologic baseline according to her daughter, at bedside. Has an outpatient neurology appointment pending for tomorrow (12/13).    Neurologic exam as per resident note with additions as below:  AAO x3, speech fluent  CN's II-XII intact. L > R end gaze non-fatigable nystagmus  Strength 5/5 all  Sens intact all  FtN intact b/l  Downgoing b/l plantar response    < from: MR Head w/wo IV Cont (12.12.22 @ 08:38) >    No acute intracranial hemorrhage, acute ischemia, or abnormal   intracranial enhancement.    Multiple nonspecific abnormal white matter foci of T2/FLAIR prolongation   statistically favoring microvascular disease.    Chronic lacunar infarct in the left basal ganglia.    < end of copied text >        A/P:  Other amnesia  HTN  Breast cancer  Prior stroke    - Etiology for above episode is most consistent with transient global amnesia (TGA) given description, resolution, no focal neurologic deficits or abnormal movements, and unrevealing MRI brain results. Prior stroke noted in left basal ganglia, but would not be contributory to current clinical picture. Although cannot complete rule out non-convulsive seizure this would be lower on the differential. She is back to baseline at this time and wants to go home  - No neurologic contraindications to discharge if patient is otherwise medically stable. She can follow-up with outpatient neurologist, who she has appointment with tomorrow (12/13)  - ASA 81mg PO daily for secondary stroke prophylaxis if no medical contraindications  - Continue to address above medical problems, as you are doing  - Will sign off, please call with additional questions or concerns

## 2022-12-11 NOTE — ED PROVIDER NOTE - NS ED ATTENDING STATEMENT MOD
This was a shared visit with the ZAKIA. I reviewed and verified the documentation and independently performed the documented:

## 2022-12-11 NOTE — ED PROVIDER NOTE - OBJECTIVE STATEMENT
73 yo F  with a PMH of HTN, HLD, breast ca p/w AMS x this afternoon around 3PM. Daughter states that she had an episode of AMS that lasted a few hours. Was found snaking a  drain outside of her house but cannot recall doing this. Called her daughter in the afternoon and reported to her that she felt "off and everything went white." Did not recall speaking with her daughter earlier that day either. Daughter states pt now returning back to baseline. Pt states she now remembers briefly that she was cleaning the drain but cannot give details about this. Has never had this happen to the her before. Denies nausea, vomiting, fever, chills, chest pain, abd pain, headache, dizziness, weakness, syncope. Pt did not want , daughter at bedside interpreting.

## 2022-12-11 NOTE — CONSULT NOTE ADULT - ASSESSMENT
INCOMPLETE    Assessment: ***. Initial VS in ED: ***. Exam: ***.      mRS: 0  LKN:   NIHSS:     Impression: ***    Recommendations:  INCOMPLETE  [] CBC, CMP. glucose, Mg, Ph, Coags, TSH, vitamins B1, B6, B9, B12, calcium, Vitain D, Iron studies, LDL,  A1c, ammonia, CK, ESR, CRP, Lyme, syphilis,   [] MRI brain without contrast, including DWI sequence, with attn to hippocampus and mediobasal temporal lobes INCOMPLETE  [] Atorvastatin 80mg (titrate to LDL < 70)   [] DVT prophylaxis per primary team  [] Telemonitoring  [] Neurological checks and vital signs per unit protocol  [] BGM goals 140-180  [] PT/OT; S/S evaluation    * Case and plan not final until Attending attestation * Assessment: Patient SUJATA ARANGO is a Czech-speaking 72y (1950) woman with Hx of HTN, HLD, breast cancer in remission, who presents to the ED complaining of AMS since 3PM in the afternoon. Daughter at bedside reports patient called her in the afternoon to tell her she was feeling nauseated and that "everything went white." Patient was found snaking a  drain outside her house. Patient did not recall calling her daughter and does not recall snaking the drain. Per daughter patient is now slowly returning to her baseline, and patient slowly starting to recall some of the details from this afternoon. She was oriented to self, place, time, but had blanks in her memory. This is the first time something like this has happened. She reports now that she was in the  for multiple hours today, inhaling fumes.  Patient denies fevers, chills, nausea, vomiting, abdominal pain, chest pain, headache, dizziness, syncope. She ambulated steadily to the bathroom.   VS in ED: Temp 97.7, HR 75. /80. RR 16. O2 99%. Patient was seen in the ED. Daughter at bedside helping to translate. Patient states she was working for 3 hours today outside, snaking a pipe, and was inhaling the fumes during that time. She called her daughter on the phone at 12:!5PM to discuss plans for the day. She was at her baseline then per daughter. At about 3PM, her daughter came to pick her up to take her shopping, as discussed earlier, and patient did not remember their prior conversation or plans for the day. She realized she had blanks in her memory. Some of her memory slowly came back; eg, she remembered she had gone to [employer] earlier in the day. Patient felt fine overall but daughters concerned so brought her in to the ED for further evaluation. SInce time her daughters were with her, patient has had no further deficits in retaining her memory going forward from 3PM. Patient looks well at bedside. On ROS, denies any headache, fevers, chills, nausea, vomiting, diarrhea, constipation, changes in urination, weakness of arms or legs, sensory deficits in arms or legs, changes in vision, smell, taste, hearing, difficulty with words or speech.     mRS: 0  LKN: 12:15PM 12/11  NIHSS: 0    Impression: Brief episode of memory loss, likely TGA in the setting of physical exertion, exposure to outdoor cold temperature, inhalation of fumes. Personal identity intact. Higher cognitive functions intact, procedural memory, spelling and reading intact, clock drawing intact, no gait issues. Neuro exam non focal. Will rule out TIA.    Recommendations:  [] MRI brain with contrast to evaluate for any metastasis and MRI brain without contrast, including DWI sequence, with attn to hippocampus and mediobasal temporal lobes and mamillothalamic tracts  [] Atorvastatin 80mg (titrate to LDL < 70)   [] DVT prophylaxis per primary team  [] Telemonitoring x 24 hours  [] Neurological checks and vital signs per unit protocol  [] BGM goals 140-180  [] PT/OT; S/S evaluation  [] CBC, CMP. glucose, Mg, Ph, Coags, TSH, vitamins B1, B6, B9, B12, calcium, Vitain D, Iron studies, LDL,  A1c, ammonia, CK, ESR, CRP, Lyme, syphilis,     * Case and plan not final until Attending attestation * Assessment: Patient SUJATA ARANGO is a Salvadorean-speaking 72y (1950) woman with Hx of HTN, HLD, breast cancer in remission, who presents to the ED complaining of AMS since 3PM in the afternoon. Daughter at bedside reports patient called her in the afternoon to tell her she was feeling nauseated and that "everything went white." Patient was found snaking a  drain outside her house. Patient did not recall calling her daughter and does not recall snaking the drain. Per daughter patient is now slowly returning to her baseline, and patient slowly starting to recall some of the details from this afternoon. She was oriented to self, place, time, but had blanks in her memory. This is the first time something like this has happened. She reports now that she was in the  for multiple hours today, inhaling fumes.  Patient denies fevers, chills, nausea, vomiting, abdominal pain, chest pain, headache, dizziness, syncope. She ambulated steadily to the bathroom.   VS in ED: Temp 97.7, HR 75. /80. RR 16. O2 99%. Patient was seen in the ED. Daughter at bedside helping to translate. Patient states she was working for 3 hours today outside, snaking a pipe, and was inhaling the fumes during that time. She called her daughter on the phone at 12:!5PM to discuss plans for the day. She was at her baseline then per daughter. At about 3PM, her daughter came to pick her up to take her shopping, as discussed earlier, and patient did not remember their prior conversation or plans for the day. She realized she had blanks in her memory. Some of her memory slowly came back; eg, she remembered she had gone to [place] earlier in the day. Patient felt fine overall but daughters concerned so brought her in to the ED for further evaluation. SInce time her daughters were with her, patient has had no further deficits in retaining her memory going forward from 3PM. Patient looks well at bedside. On ROS, denies any headache, fevers, chills, nausea, vomiting, diarrhea, constipation, changes in urination, weakness of arms or legs, sensory deficits in arms or legs, changes in vision, smell, taste, hearing, difficulty with words or speech.     mRS: 0  LKN: 12:15PM 12/11  NIHSS: 0    Impression: Brief episode of memory loss, likely TGA in the setting of physical exertion, exposure to outdoor cold temperature, inhalation of fumes. Personal identity intact. Higher cognitive functions intact, procedural memory, spelling and reading intact, clock drawing intact, no gait issues. Neuro exam non focal. Will rule out TIA.    Recommendations:  [] MRI brain with contrast to evaluate for any metastasis and MRI brain without contrast, including DWI sequence, with attn to hippocampus and mediobasal temporal lobes and mamillothalamic tracts  [] Atorvastatin 80mg (titrate to LDL < 70)   [] DVT prophylaxis per primary team  [] Telemonitoring x 24 hours  [] Neurological checks and vital signs per unit protocol  [] BGM goals 140-180  [] PT/OT; S/S evaluation  [] CBC, CMP. glucose, Mg, Ph, Coags, TSH, vitamins B1, B6, B9, B12, calcium, Vitain D, Iron studies, LDL,  A1c, ammonia, CK, ESR, CRP, Lyme, syphilis,   [] patient can follow up with Dr. Forman after discharge.     * Case and plan not final until Attending attestation *

## 2022-12-12 VITALS
RESPIRATION RATE: 16 BRPM | DIASTOLIC BLOOD PRESSURE: 74 MMHG | OXYGEN SATURATION: 98 % | HEART RATE: 77 BPM | TEMPERATURE: 98 F | SYSTOLIC BLOOD PRESSURE: 126 MMHG

## 2022-12-12 DIAGNOSIS — Z98.890 OTHER SPECIFIED POSTPROCEDURAL STATES: Chronic | ICD-10-CM

## 2022-12-12 DIAGNOSIS — Z90.710 ACQUIRED ABSENCE OF BOTH CERVIX AND UTERUS: Chronic | ICD-10-CM

## 2022-12-12 DIAGNOSIS — Z90.49 ACQUIRED ABSENCE OF OTHER SPECIFIED PARTS OF DIGESTIVE TRACT: Chronic | ICD-10-CM

## 2022-12-12 LAB
24R-OH-CALCIDIOL SERPL-MCNC: 27.5 NG/ML — LOW (ref 30–80)
A1C WITH ESTIMATED AVERAGE GLUCOSE RESULT: 5.4 % — SIGNIFICANT CHANGE UP (ref 4–5.6)
AMMONIA BLD-MCNC: 33 UMOL/L — SIGNIFICANT CHANGE UP (ref 11–55)
B BURGDOR C6 AB SER-ACNC: NEGATIVE — SIGNIFICANT CHANGE UP
B BURGDOR IGG+IGM SER-ACNC: 0.11 INDEX — SIGNIFICANT CHANGE UP (ref 0.01–0.89)
CHOLEST SERPL-MCNC: 132 MG/DL — SIGNIFICANT CHANGE UP
CRP SERPL-MCNC: <3 MG/L — SIGNIFICANT CHANGE UP (ref 0–4)
ERYTHROCYTE [SEDIMENTATION RATE] IN BLOOD: 12 MM/HR — SIGNIFICANT CHANGE UP (ref 0–20)
ESTIMATED AVERAGE GLUCOSE: 108 MG/DL — SIGNIFICANT CHANGE UP (ref 68–114)
FOLATE SERPL-MCNC: >20 NG/ML — SIGNIFICANT CHANGE UP
HDLC SERPL-MCNC: 48 MG/DL — LOW
LIPID PNL WITH DIRECT LDL SERPL: 73 MG/DL — SIGNIFICANT CHANGE UP
NON HDL CHOLESTEROL: 84 MG/DL — SIGNIFICANT CHANGE UP
T PALLIDUM AB TITR SER: NEGATIVE — SIGNIFICANT CHANGE UP
TRIGL SERPL-MCNC: 54 MG/DL — SIGNIFICANT CHANGE UP
VIT B12 SERPL-MCNC: 505 PG/ML — SIGNIFICANT CHANGE UP (ref 232–1245)

## 2022-12-12 PROCEDURE — 83036 HEMOGLOBIN GLYCOSYLATED A1C: CPT

## 2022-12-12 PROCEDURE — 84207 ASSAY OF VITAMIN B-6: CPT

## 2022-12-12 PROCEDURE — 82962 GLUCOSE BLOOD TEST: CPT

## 2022-12-12 PROCEDURE — 70553 MRI BRAIN STEM W/O & W/DYE: CPT | Mod: 26,MA

## 2022-12-12 PROCEDURE — 87637 SARSCOV2&INF A&B&RSV AMP PRB: CPT

## 2022-12-12 PROCEDURE — 70450 CT HEAD/BRAIN W/O DYE: CPT | Mod: MA

## 2022-12-12 PROCEDURE — 87086 URINE CULTURE/COLONY COUNT: CPT

## 2022-12-12 PROCEDURE — 83735 ASSAY OF MAGNESIUM: CPT

## 2022-12-12 PROCEDURE — 86140 C-REACTIVE PROTEIN: CPT

## 2022-12-12 PROCEDURE — 84443 ASSAY THYROID STIM HORMONE: CPT

## 2022-12-12 PROCEDURE — 85025 COMPLETE CBC W/AUTO DIFF WBC: CPT

## 2022-12-12 PROCEDURE — 84145 PROCALCITONIN (PCT): CPT

## 2022-12-12 PROCEDURE — 84425 ASSAY OF VITAMIN B-1: CPT

## 2022-12-12 PROCEDURE — 70498 CT ANGIOGRAPHY NECK: CPT | Mod: MA

## 2022-12-12 PROCEDURE — 84100 ASSAY OF PHOSPHORUS: CPT

## 2022-12-12 PROCEDURE — 71046 X-RAY EXAM CHEST 2 VIEWS: CPT

## 2022-12-12 PROCEDURE — 82746 ASSAY OF FOLIC ACID SERUM: CPT

## 2022-12-12 PROCEDURE — 93005 ELECTROCARDIOGRAM TRACING: CPT

## 2022-12-12 PROCEDURE — 82140 ASSAY OF AMMONIA: CPT

## 2022-12-12 PROCEDURE — 99236 HOSP IP/OBS SAME DATE HI 85: CPT | Mod: FS

## 2022-12-12 PROCEDURE — 86780 TREPONEMA PALLIDUM: CPT

## 2022-12-12 PROCEDURE — 82805 BLOOD GASES W/O2 SATURATION: CPT

## 2022-12-12 PROCEDURE — 70553 MRI BRAIN STEM W/O & W/DYE: CPT | Mod: MA

## 2022-12-12 PROCEDURE — G0378: CPT

## 2022-12-12 PROCEDURE — 70496 CT ANGIOGRAPHY HEAD: CPT | Mod: MA

## 2022-12-12 PROCEDURE — 99285 EMERGENCY DEPT VISIT HI MDM: CPT | Mod: 25

## 2022-12-12 PROCEDURE — 86618 LYME DISEASE ANTIBODY: CPT

## 2022-12-12 PROCEDURE — 81001 URINALYSIS AUTO W/SCOPE: CPT

## 2022-12-12 PROCEDURE — 84484 ASSAY OF TROPONIN QUANT: CPT

## 2022-12-12 PROCEDURE — 80053 COMPREHEN METABOLIC PANEL: CPT

## 2022-12-12 PROCEDURE — 85652 RBC SED RATE AUTOMATED: CPT

## 2022-12-12 PROCEDURE — 83880 ASSAY OF NATRIURETIC PEPTIDE: CPT

## 2022-12-12 PROCEDURE — 82306 VITAMIN D 25 HYDROXY: CPT

## 2022-12-12 PROCEDURE — 82607 VITAMIN B-12: CPT

## 2022-12-12 PROCEDURE — 80061 LIPID PANEL: CPT

## 2022-12-12 PROCEDURE — 36415 COLL VENOUS BLD VENIPUNCTURE: CPT

## 2022-12-12 RX ORDER — METOPROLOL TARTRATE 50 MG
25 TABLET ORAL DAILY
Refills: 0 | Status: DISCONTINUED | OUTPATIENT
Start: 2022-12-12 | End: 2022-12-15

## 2022-12-12 RX ORDER — ATORVASTATIN CALCIUM 80 MG/1
10 TABLET, FILM COATED ORAL AT BEDTIME
Refills: 0 | Status: DISCONTINUED | OUTPATIENT
Start: 2022-12-12 | End: 2022-12-15

## 2022-12-12 RX ORDER — AMLODIPINE BESYLATE 2.5 MG/1
2.5 TABLET ORAL DAILY
Refills: 0 | Status: DISCONTINUED | OUTPATIENT
Start: 2022-12-12 | End: 2022-12-15

## 2022-12-12 RX ADMIN — Medication 25 MILLIGRAM(S): at 04:59

## 2022-12-12 RX ADMIN — Medication 1 MILLIGRAM(S): at 07:30

## 2022-12-12 RX ADMIN — ATORVASTATIN CALCIUM 10 MILLIGRAM(S): 80 TABLET, FILM COATED ORAL at 03:13

## 2022-12-12 RX ADMIN — AMLODIPINE BESYLATE 2.5 MILLIGRAM(S): 2.5 TABLET ORAL at 04:59

## 2022-12-12 NOTE — ED CDU PROVIDER INITIAL DAY NOTE - DETAILS
71 yo F  with a PMH of HTN, HLD, breast ca p/w AMS x this afternoon  Plan: frequent reeval, vitals q 4hrs, tele, neuro checks, MRI brain w/wo contrast

## 2022-12-12 NOTE — ED CDU PROVIDER DISPOSITION NOTE - CLINICAL COURSE
Ok its on my desk 73 yo F  with a PMH of HTN, HLD, breast ca p/w AMS x this afternoon around 3PM. Daughter states that she had an episode of AMS that lasted a few hours. Was found snaking a  drain outside of her house but cannot recall doing this. Called her daughter in the afternoon and reported to her that she felt "off and everything went white." Did not recall speaking with her daughter earlier that day either. Daughter states pt now returning back to baseline. Pt states she now remembers briefly that she was cleaning the drain but cannot give details about this. Has never had this happen to the her before. Denies nausea, vomiting, fever, chills, chest pain, abd pain, headache, dizziness, weakness, syncope. Pt did not want , daughter at bedside interpreting.  In ED, patient had non actionable labs and CT head w/ no acute findings. Pt evaluated by Neurology who recommended CDU for frequent reeval, vitals q 4hrs, tele, neuro checks, MRI brain w/wo contrast. 73 yo F  with a PMH of HTN, HLD, breast ca p/w AMS x this afternoon around 3PM. Daughter states that she had an episode of AMS that lasted a few hours. Was found snaking a  drain outside of her house but cannot recall doing this. Called her daughter in the afternoon and reported to her that she felt "off and everything went white." Did not recall speaking with her daughter earlier that day either. Daughter states pt now returning back to baseline. Pt states she now remembers briefly that she was cleaning the drain but cannot give details about this. Has never had this happen to the her before. Denies nausea, vomiting, fever, chills, chest pain, abd pain, headache, dizziness, weakness, syncope. Pt did not want , daughter at bedside interpreting.  In ED, patient had non actionable labs and CT head w/ no acute findings. Pt evaluated by Neurology who recommended CDU for frequent reeval, vitals q 4hrs, tele, neuro checks, MRI brain w/wo contrast. Patient did well in CDU overnight. returned to baseline neurological status, no focal neurological deficits were noted. MRI negative for acute infarct. Patient re-evaluated by neurology team w/ neurology attending Dr. Tilley, cleared for discharge home and outpatient follow up. Patient and daughter instructed on outpatient follow up with neurology and PMD. Stable at discharge.

## 2022-12-12 NOTE — ED CDU PROVIDER DISPOSITION NOTE - ATTENDING CONTRIBUTION TO CARE
I , Dr Frank Donovan has seen and evaluated the patient.  The patient reports improvement in symptoms.  The patient is stable for discharge home and will follow up with their primary physician and neurology.

## 2022-12-12 NOTE — ED CDU PROVIDER INITIAL DAY NOTE - OBJECTIVE STATEMENT
71 yo F  with a PMH of HTN, HLD, breast ca p/w AMS x this afternoon around 3PM. Daughter states that she had an episode of AMS that lasted a few hours. Was found snaking a  drain outside of her house but cannot recall doing this. Called her daughter in the afternoon and reported to her that she felt "off and everything went white." Did not recall speaking with her daughter earlier that day either. Daughter states pt now returning back to baseline. Pt states she now remembers briefly that she was cleaning the drain but cannot give details about this. Has never had this happen to the her before. Denies nausea, vomiting, fever, chills, chest pain, abd pain, headache, dizziness, weakness, syncope. Pt did not want , daughter at bedside interpreting. 71 yo F  with a PMH of HTN, HLD, breast ca p/w AMS x this afternoon around 3PM. Daughter states that she had an episode of AMS that lasted a few hours. Was found snaking a  drain outside of her house but cannot recall doing this. Called her daughter in the afternoon and reported to her that she felt "off and everything went white." Did not recall speaking with her daughter earlier that day either. Daughter states pt now returning back to baseline. Pt states she now remembers briefly that she was cleaning the drain but cannot give details about this. Has never had this happen to the her before. Denies nausea, vomiting, fever, chills, chest pain, abd pain, headache, dizziness, weakness, syncope. Pt did not want , daughter at bedside interpreting.  In ED, patient had non actionable labs and CT head w/ no acute findings. Pt evaluated by Neurology who recommended CDU for frequent reeval, vitals q 4hrs, tele, neuro checks, MRI brain w/wo contrast.

## 2022-12-12 NOTE — ED CDU PROVIDER DISPOSITION NOTE - NSFOLLOWUPINSTRUCTIONS_ED_ALL_ED_FT
(1) You will need to follow up with your PMD and our recommended neurologist (____) in 2-3 days for your _____. A copy of your results were given to you to bring to your appt.  (2) Continue your home medications as directed.  (3) Drink plenty of fluids to stay hydrated.  (4) Read attached discharge paperwork.  (5) Return to ER for headache, confusion, behavior/speech changes, numbness/tingling/weakness in your arms/legs, or any other concerns. Follow-up with your primary doctor in the next 1 to 2 days.  Additionally please follow-up with your neurologist tomorrow as previously scheduled.  If you prefer you may also follow-up with our neurologist Dr. Diggs within the next 1 week, please call to schedule an appointment.    Lon Diggs)  Neurology; Neurophysiology  [address] [facility] [facility] [address], [address]  [place] [address]  Phone: [phone]  Fax: [phone]  Follow Up Time: 4-6 Days    Rest, stay hydrated, continue all current home medications as previously prescribed.    Please note your CT scan you had performed here showed a thyroid nodule for which she should follow-up with your primary doctor for.  You will need a thyroid ultrasound as outpatient to further evaluate.    Return to the emergency department immediately if you develop any new/worsening symptoms including but not limited to confusion, difficulty walking, weakness, numbness/tingling, severe headache, or any other concerning symptoms.

## 2022-12-12 NOTE — ED CDU PROVIDER INITIAL DAY NOTE - NSICDXPASTSURGICALHX_GEN_ALL_CORE_FT
PAST SURGICAL HISTORY:  No significant past surgical history PAST SURGICAL HISTORY:  H/O: hysterectomy     History of cholecystectomy     History of lumpectomy

## 2022-12-12 NOTE — ED CDU PROVIDER INITIAL DAY NOTE - PROGRESS NOTE DETAILS
CDU PROGRESS NOTE OLLIE REYES: Daughter at bedside. Pt resting comfortably, feeling well without complaint. NAD, VSS. No events on telemetry. No focal neuro deficits on exam. Will continue to monitor, MRI in am. Patient reports anxiety for testing due to claustrophobia, will likely require pre-medication w/ Ativan prior to MRI. Patient tolerated MRI w/o difficulty. Evaluated at bedside, in NAD.  VSS.  Patient resting comfortably without complaints. Asymptomatic. Daughter at bedside feels pt at baseline. Neuro exam intact, no focal deficits on exam. Awaiting MRI result and neuro final recs/re-eval. - Nadir Lombardi PA-C Neurology team and attending Dr. Tilley at bedside. - Nadir Lombardi PA-C Neurology team reviewed all results, cleared for discharge home, pt has appt tomorrow with her neurologist. Pt evaluated by ED attending Dr. Donovan who cleared for discharge home. Gave copy of and went over all results with patient at bedside, made aware of thyroid nodule and need to f/u as outpt. Discussed importance of PMD f/u in next 1-2 days and advised on strict return precautions. Stable for d/c. - Nadir Lombardi PA-C

## 2022-12-12 NOTE — ED CDU PROVIDER DISPOSITION NOTE - PATIENT PORTAL LINK FT
You can access the FollowMyHealth Patient Portal offered by [facility] by registering at the following website: [email or web address]. By joining [facility]’s FollowMyHealth portal, you will also be able to view your health information using other applications (apps) compatible with our system.

## 2022-12-12 NOTE — ED ADULT NURSE REASSESSMENT NOTE - NS ED NURSE REASSESS COMMENT FT1
07.00 Am Received the Pt from  [facility]  . Pt is Observed for AMS which is resolved now for MRI . Received the Pt A&OX 4 obeys commands Shayna N/V/D fever chills cp SOB   Comfort care & safety measures continued  IV site looks clean & dry no signs of infiltration noted pt denies  pain IV site .  Pt is advised to call for help  call bell with in the reach pt verbalized the understanding .  pending CDU  MD lerma . GCS 15/15 A&OX 4 PERRLA  size 3 Strong upper & lower extremities steady gait   No facial droop  No Hand Leg drop denies numbness tingling Continue to monitor

## 2022-12-12 NOTE — ED CDU PROVIDER DISPOSITION NOTE - CARE PROVIDER_API CALL
Lon Diggs)  Neurology; Neurophysiology  [address], [address]  [place] [address]  Phone: [phone]  Fax: [phone]  Follow Up Time: 4-6 Days

## 2022-12-13 LAB
CULTURE RESULTS: SIGNIFICANT CHANGE UP
SPECIMEN SOURCE: SIGNIFICANT CHANGE UP

## 2022-12-14 NOTE — ED POST DISCHARGE NOTE - RESULT SUMMARY
UCX 10-49 GBS, likely contaminate; pt UA appeared negative and. No clinical indication for treatment at this time or need to further contact patient - Sharmila Manley PA-C

## 2022-12-15 LAB
PYRIDOXAL PHOS SERPL-MCNC: 3.3 UG/L — LOW (ref 3.4–65.2)
VIT B1 SERPL-MCNC: 109.7 NMOL/L — SIGNIFICANT CHANGE UP (ref 66.5–200)